# Patient Record
Sex: FEMALE | Race: WHITE
[De-identification: names, ages, dates, MRNs, and addresses within clinical notes are randomized per-mention and may not be internally consistent; named-entity substitution may affect disease eponyms.]

---

## 2019-07-16 ENCOUNTER — HOSPITAL ENCOUNTER (EMERGENCY)
Dept: HOSPITAL 91 - FTE | Age: 29
Discharge: HOME | End: 2019-07-16
Payer: MEDICAID

## 2019-07-16 ENCOUNTER — HOSPITAL ENCOUNTER (EMERGENCY)
Dept: HOSPITAL 10 - FTE | Age: 29
Discharge: HOME | End: 2019-07-16
Payer: MEDICAID

## 2019-07-16 VITALS — DIASTOLIC BLOOD PRESSURE: 81 MMHG | HEART RATE: 72 BPM | RESPIRATION RATE: 16 BRPM | SYSTOLIC BLOOD PRESSURE: 143 MMHG

## 2019-07-16 VITALS
HEIGHT: 59 IN | BODY MASS INDEX: 27.16 KG/M2 | HEIGHT: 59 IN | BODY MASS INDEX: 27.16 KG/M2 | WEIGHT: 134.7 LBS | WEIGHT: 134.7 LBS

## 2019-07-16 DIAGNOSIS — N93.9: Primary | ICD-10-CM

## 2019-07-16 DIAGNOSIS — Z32.02: ICD-10-CM

## 2019-07-16 DIAGNOSIS — R10.2: ICD-10-CM

## 2019-07-16 LAB
ADD MAN DIFF?: NO
ADD UMIC: YES
BASOPHIL #: 0 10^3/UL (ref 0–0.1)
BASOPHILS %: 0.4 % (ref 0–2)
EOSINOPHILS #: 0.1 10^3/UL (ref 0–0.5)
EOSINOPHILS %: 0.8 % (ref 0–7)
HEMATOCRIT: 43.4 % (ref 37–47)
HEMOGLOBIN: 14.7 G/DL (ref 12–16)
IMMATURE GRANS #M: 0.02 10^3/UL (ref 0–0.03)
IMMATURE GRANS % (M): 0.2 % (ref 0–0.43)
LYMPHOCYTES #: 2.8 10^3/UL (ref 0.8–2.9)
LYMPHOCYTES %: 33.9 % (ref 15–51)
MEAN CORPUSCULAR HEMOGLOBIN: 30.4 PG (ref 29–33)
MEAN CORPUSCULAR HGB CONC: 33.9 G/DL (ref 32–37)
MEAN CORPUSCULAR VOLUME: 89.9 FL (ref 82–101)
MEAN PLATELET VOLUME: 8.8 FL (ref 7.4–10.4)
MONOCYTE #: 0.4 10^3/UL (ref 0.3–0.9)
MONOCYTES %: 5.1 % (ref 0–11)
NEUTROPHIL #: 5 10^3/UL (ref 1.6–7.5)
NEUTROPHILS %: 59.6 % (ref 39–77)
NUCLEATED RED BLOOD CELLS #: 0 10^3/UL (ref 0–0)
NUCLEATED RED BLOOD CELLS%: 0 /100WBC (ref 0–0)
PLATELET COUNT: 345 10^3/UL (ref 140–415)
RED BLOOD COUNT: 4.83 10^6/UL (ref 4.2–5.4)
RED CELL DISTRIBUTION WIDTH: 11.9 % (ref 11.5–14.5)
UR ASCORBIC ACID: NEGATIVE MG/DL
UR BACTERIA: (no result) /HPF
UR BILIRUBIN (DIP): NEGATIVE MG/DL
UR BLOOD (DIP): (no result) MG/DL
UR CLARITY: (no result)
UR COLOR: YELLOW
UR GLUCOSE (DIP): NEGATIVE MG/DL
UR KETONES (DIP): NEGATIVE MG/DL
UR LEUKOCYTE ESTERASE (DIP): (no result) LEU/UL
UR NITRITE (DIP): NEGATIVE MG/DL
UR PH (DIP): 6 (ref 5–9)
UR RBC: > 182 /HPF (ref 0–5)
UR SPECIFIC GRAVITY (DIP): 1.02 (ref 1–1.03)
UR SQUAMOUS EPITHELIAL CELL: (no result) /HPF
UR TOTAL PROTEIN (DIP): (no result) MG/DL
UR UROBILINOGEN (DIP): NEGATIVE MG/DL
UR WBC: 22 /HPF (ref 0–5)
WHITE BLOOD COUNT: 8.3 10^3/UL (ref 4.8–10.8)

## 2019-07-16 PROCEDURE — 81025 URINE PREGNANCY TEST: CPT

## 2019-07-16 PROCEDURE — 86900 BLOOD TYPING SEROLOGIC ABO: CPT

## 2019-07-16 PROCEDURE — 76817 TRANSVAGINAL US OBSTETRIC: CPT

## 2019-07-16 PROCEDURE — 86901 BLOOD TYPING SEROLOGIC RH(D): CPT

## 2019-07-16 PROCEDURE — 84702 CHORIONIC GONADOTROPIN TEST: CPT

## 2019-07-16 PROCEDURE — 99284 EMERGENCY DEPT VISIT MOD MDM: CPT

## 2019-07-16 PROCEDURE — 36415 COLL VENOUS BLD VENIPUNCTURE: CPT

## 2019-07-16 PROCEDURE — 76801 OB US < 14 WKS SINGLE FETUS: CPT

## 2019-07-16 PROCEDURE — 85025 COMPLETE CBC W/AUTO DIFF WBC: CPT

## 2019-07-16 PROCEDURE — 81001 URINALYSIS AUTO W/SCOPE: CPT

## 2019-07-16 NOTE — ERD
ER Documentation


Chief Complaint


Chief Complaint





VAG BLEED , PELVIC PAIN , PREG 10 WEEKS





HPI


29-year-old female presenting with vaginal bleeding and pelvic pain.  Patient 


believes she is about 10 weeks pregnant.  G4, .  Patient is having bleeding


but no clots.  Patient LNMP May 1.  Medical history denies.  NKDA.  Surgical 


history denies.  Social history denies





ROS


All systems reviewed and are negative except as per history of present illness.





Medications


Home Meds


Active Scripts


Naproxen* (Naprosyn*) 500 Mg Tablet, 500 MG PO BID PRN for PAIN AND/OR 


INFLAMMATION, #30 TAB


   Prov:CHAYA WOODWARD PA-C         19





Allergies


Allergies:  


Coded Allergies:  


     No Known Allergy (Unverified , 19)





PMhx/Soc


Medical and Surgical Hx:  pt denies Medical Hx, pt denies Surgical Hx


Hx Alcohol Use:  No


Hx Substance Use:  No


Hx Tobacco Use:  No


Smoking Status:  Never smoker





FmHx


Family History:  No diabetes, No coronary disease, No other





Physical Exam


Vitals





Vital Signs


  Date      Temp  Pulse  Resp  B/P (MAP)   Pulse Ox  O2          O2 Flow    FiO2


Time                                                 Delivery    Rate


   19  98.8     72    16      143/81        98


     13:51                          (101)





Physical Exam


GENERAL: The patient is well-appearing, well-nourished, in no acute distress


HEENT: Atraumatic.  Conjunctivae are pink.  Pupils equal, round, and reactive to


light.  There is no scleral icterus.  Tympanic membranes clear bilaterally.  


Oropharynx clear. 


CHEST: Clear to auscultation bilaterally.  There are no rales, wheezes or 


rhonchi.


HEART: Regular rate and rhythm.  No murmurs, clicks, rubs or gallops.  


ABDOMEN: Normal active bowel sounds.  No distention.  No organomegaly.  


Generalized lower pelvic pressure with palpation.


BACK: No midline or flank tenderness.


Result Diagram:  


19 1445





Results 24 hrs





Laboratory Tests


Test
                              19
14:45    19
14:46  19
14:52


White Blood Count                   8.3 10^3/ul


Red Blood Count                    4.83 10^6/ul


Hemoglobin                            14.7 g/dl


Hematocrit                               43.4 %


Mean Corpuscular Volume                 89.9 fl


Mean Corpuscular Hemoglobin             30.4 pg


Mean Corpuscular                     33.9 g/dl 
  
                



Hemoglobin
Concent


Red Cell Distribution Width              11.9 %


Platelet Count                      345 10^3/UL


Mean Platelet Volume                     8.8 fl


Immature Granulocytes %                 0.200 %


Neutrophils %                            59.6 %


Lymphocytes %                            33.9 %


Monocytes %                               5.1 %


Eosinophils %                             0.8 %


Basophils %                               0.4 %


Nucleated Red Blood Cells %         0.0 /100WBC


Immature Granulocytes #           0.020 10^3/ul


Neutrophils #                       5.0 10^3/ul


Lymphocytes #                       2.8 10^3/ul


Monocytes #                         0.4 10^3/ul


Eosinophils #                       0.1 10^3/ul


Basophils #                         0.0 10^3/ul


Nucleated Red Blood Cells #         0.0 10^3/ul


Beta HCG, Quantitative            < 2.4 mIU/ml


Urine Color                                       YELLOW


Urine Clarity                                     CLOUDY


Urine pH                                                     6.0


Urine Specific Gravity                                     1.021


Urine Ketones                                     NEGATIVE mg/dL


Urine Nitrite                                     NEGATIVE mg/dL


Urine Bilirubin                                   NEGATIVE mg/dL


Urine Urobilinogen                                NEGATIVE mg/dL


Urine Leukocyte Esterase                          TRACE Villa/ul


Urine Microscopic RBC                             > 182 /HPF


Urine Microscopic WBC                                    22 /HPF


Urine Squamous Epithelial
Cells   
               MODERATE /HPF 
  



Urine Bacteria                                    FEW /HPF


Urine Hemoglobin                                        3+ mg/dL


Urine Glucose                                     NEGATIVE mg/dL


Urine Total Protein                                     1+ mg/dl


POC Beta HCG, Qualitative                                          NEGATIVE








Procedures/MDM


                            DIAGNOSTIC IMAGING REPORT





Patient: GLADYS LUIS   : 1990   Age: 29  Sex: F                 


      


       MR #:    [id number]   Acct #:   [id number]    DOS: 19 1429


Ordering MD: ANABELLA WOODWARD PA-C   Location:  E   Room/Bed:            


                               


                                        


PROCEDURE:   US Pelvis. 


 


CLINICAL INDICATION:   Vaginal bleeding 


 


TECHNIQUE:   Multiple sonographic images of the pelvis were obtained utilizing 


transabdominal and endovaginal technique.   The images were reviewed on a PACS 


workstation. 


 


COMPARISON:   None. 


 


FINDINGS:


 


The uterus is normal in size with a normal appearance of the myometrium.  The 


uterus measures 8.3 x 4.8 x 5.4 cm. 


The endometrial stripe is homogeneous in appearance and has the thickness of 6 


mm.


There is no intrauterine gestation noted.


The ovaries are normal in size and echogenicity. Normal Doppler flow is 


identified in both ovaries.


The right ovary measures 2.2 x 1.1 x 1.5 cm.


The left ovary measures 2.6 x 1.3 x 1.5 cm.


 


No free fluid is present within the pelvis.


 


RPTAT: AA


 


IMPRESSION:


 


Unremarkable pelvic ultrasound.





MDM: 29-year-old female presenting with vaginal bleeding.  I have low suspicion 


for pregnancy as patient's urine pregnancy is negative.  I have low suspicion 


for pelvic emergency.  Patient's blood work is stable and urine is negative.  


Patient is discharged with strict ER precautions and told to follow-up with 


primary care within 1 to 2 days for close evaluation.  Patient is told symptoms 


change or worsen to return immediately to the ER.  All questions answered at 


discharge





Departure


Diagnosis:  


   Primary Impression:  


   Normal menstrual period


Condition:  Stable


Patient Instructions:  Menorrhagia, Pelvic Pain, Unknown Cause


Referrals:  


OB/GYN REFERRAL LIST


KYLIE GAMA MD


[address] [facility] 


[address] [place] [address]


[phone] OFFICE FAX [phone]





[facility]


[address] [place] [address]


[phone]





DR[facility] W[place] [place]


[address] [place] [address]


[phone]





DR QUINN, [facility]


[address], [address], [facility] [address]


[phone]





[facility]


[address] [place] [address]


[phone]





[facility]


[address] [place] [address]


[phone]


[address] [facility] [address]


[phone]  -  [phone]





[facility]


[address], [facility] [address]


[phone]





KAR[facility] T[facility]LIN[facility]


[address] [facility] [address] [facility]. [address], [facility] [address]


[phone]





[facility]ON


[address] [place], [place] [address] [phone]





Additional Instructions:  


FOLLOW UP WITH YOUR PRIMARY CARE PHYSICIAN TOMORROW.Return to this facility if 


you are not improving as expected.











CHAYA WOODWARD PA-C       2019 15:49

## 2019-07-16 NOTE — EN
Date/Time of Note


Date/Time of Note


DATE: 7/16/19 


TIME: 13:52





ER Progress Note


[age]J-62-gge[age]-old female approximately 10 weeks pregnant.  Complains of lower 


abdominal pain, slight vaginal spotting.  No SIRS criteria.  Appropriate for ED 


2.











TONNY BEYER MD             Jul 16, 2019 13:57

## 2021-04-08 ENCOUNTER — HOSPITAL ENCOUNTER (EMERGENCY)
Dept: HOSPITAL 54 - ER | Age: 31
Discharge: HOME | End: 2021-04-08
Payer: MEDICAID

## 2021-04-08 VITALS — WEIGHT: 133 LBS | HEIGHT: 58 IN | BODY MASS INDEX: 27.92 KG/M2

## 2021-04-08 VITALS — SYSTOLIC BLOOD PRESSURE: 117 MMHG | DIASTOLIC BLOOD PRESSURE: 69 MMHG

## 2021-04-08 DIAGNOSIS — L98.8: Primary | ICD-10-CM

## 2021-04-08 PROCEDURE — 99281 EMR DPT VST MAYX REQ PHY/QHP: CPT
